# Patient Record
Sex: FEMALE | Race: WHITE
[De-identification: names, ages, dates, MRNs, and addresses within clinical notes are randomized per-mention and may not be internally consistent; named-entity substitution may affect disease eponyms.]

---

## 2019-07-03 NOTE — PDOC
Documentation entered by Emily Smith SCRIBE, acting as scribe for Juliet Hughes MD.








Juliet Hughes MD:  This documentation has been prepared by the Sarah mejia Sammi, SCRIBE, under my direction and personally reviewed by me in 

its entirety.  I confirm that the documentation accurately reflects all work, 

treatment, procedures, and medical decision making performed by me.  





Attending Attestation





- Resident


Resident Name: Dulce Mesa





- ED Attending Attestation


I have performed the following: I have examined & evaluated the patient, The 

case was reviewed & discussed with the resident, I agree w/resident's findings 

& plan, Exceptions are as noted





- HPI


HPI: 





07/03/19 10:13


The patient is a 29 year old female, with no significant PMH, who presents to 

the emergency room for evaluation of over 1 month of vaginal bleeding. Pt 

reports bleeding began 1 month ago, was intermittent, about the same as a 

period (4-5 pads per day) and then stopped for 1 week and half but then 

returned yesteday. She notes the bleeding since yesterday has been heavier and 

she has noted some clotting, and has had to use a towel instead of pads. She 

complains of suprapubic and low back pain intermittently that she describes as 

cramping. The patient states she had stopped her birth control pill that she 

was on for 3 years about 1 month ago and the bleeding started shortly 

afterwards. The patient arrived from [place] this past weekend and 

reports being evaluated there a couple of weeks ago where she was informed the 

bleeding was due to terminating the birth control. The patient does not yet 

have a provider here. 


 


Denies nausea, vomiting, headache, constipation, or diarrhea. Denies CP/SOB, 

headache, focal weakness/numbness. Denies F/C, vaginal DC. She is not sexually 

active. 





Allergies: NKA











- Physicial Exam


PE: 





07/03/19 10:35


agree with resident exam


Well appearing


No abd ttp, rebound or guarding


No CVAT 


No back ttp


No midline spinal ttp





- Medical Decision Making





07/03/19 10:36


28yo F presents to the ED with vaginal bleeding intermittently for 1 month 

immediately after stopping PO birth control


Vitals with borderline tachycardia, otherwise wnl


DDx includes DUB vs first trimester bleeding vs ovarian cyst (although no R or 

L adnexal ttp on Dr. Mesa's exam)


Plan for urine preg, labs, TVUS, UA, reassess





07/03/19 13:26


UPT neg


Labs with anemia to 9.4, otherwise wnl


UA with possible UTI, although dirty sample and thus will hold off on treatment 

as pt has no urinary sxs


TVUS wnl


Likely DUB s/p stopping OCPs


She is hemodynamically stable and well appearing


Communicated to patient to return to the ED if she is soaking through 2 or more 

pads per hour for more than 2 hours


She expresses understadning


She will f/u with Dr. Snider 





I discussed the physical exam findings, ancillary test results and final 

diagnoses with the patient. I answered all of the patient's questions. The 

patient was satisfied with the care received and felt comfortable with the 

discharge plan and treatment plan. The patient will call their primary care 

physician within 24 hours to arrange follow-up and will return to the Emergency 

Department with any new, persistent or worsening symptoms.

## 2019-07-03 NOTE — PDOC
History of Present Illness





- General


Chief Complaint: Vaginal Bleeding


Stated Complaint: VAGINAL BLEEDING


Time Seen by Provider: 19 09:30


History Source: Patient, Parent(s)


Exam Limitations: Language Barrier





- History of Present Illness


Travel History: Yes (From , arrived 2019)


Initial Comments: 





19 10:02


30yo F with no significant PMH presenting to ED for vaginal bleeding. She came 

from the  about 1 week ago. She was on OCPs "Noge?" for 3 years and stopped 

taking it 1 month ago. She was having bleeding after stopping but the bleeding 

stopped for about 1.5 weeks. Bleeding returned last night and has been worse 

today. She states that she used about 2 pads or so before but today she used 

pads and went through 3 towels. She noticed clots as well. She endorses pelvic 

cramping. Denies fevers, chills, n/v/d, urinary symptoms, back pain, sob, chest 

pain, lightheadedness, dizziness, palpitations. 





PMD: in 


PMH: none


PSH: none


Meds: none


Allergies: nkda





Past History





- Past Medical History


Allergies/Adverse Reactions: 


 Allergies











Allergy/AdvReac Type Severity Reaction Status Date / Time


 


No Known Allergies Allergy   Verified 19 09:24











Home Medications: 


Ambulatory Orders





NK [No Known Home Medication]  19 








COPD: No





- Suicide/Smoking/Psychosocial Hx


Smoking History: Never smoked


Information on smoking cessation initiated: No


Hx Alcohol Use: No


Drug/Substance Use Hx: No





*Physical Exam





- Vital Signs


 Last Vital Signs











Temp Pulse Resp BP Pulse Ox


 


 98 F   100 H  18   118/81   100 


 


 19 09:23  19 09:23  19 09:23  19 09:23  19 09:23














ED Treatment Course





- LABORATORY


CBC & Chemistry Diagram: 


 19 10:23





 19 10:23





Medical Decision Making





- Medical Decision Making





19 10:12


30yo F with no significant PMH presenting to ED for vaginal bleeding. She came 

from the  about 1 week ago. She was on OCPs "Noge?" for 3 years and stopped 

taking it 1 month ago. She was having bleeding after stopping but the bleeding 

stopped for about 1.5 weeks. Bleeding returned last night and has been worse 

today. She states that she used about 2 pads or so before but today she used 

pads and went through 3 towels. She noticed clots as well. She endorses pelvic 

cramping. Denies fevers, chills, n/v/d, urinary symptoms, back pain, sob, chest 

pain, lightheadedness, dizziness, palpitations. 


   Vitals: hr 100


   PE: bright blood in vaginal vault with clots, actively bleeding. discomfort 

with bimanual exam





ddx includes but not limited to pregnancy (iup v. ectopic), , fibroids, 

medication discontinuation, coagulopathy, mass/malignancy





with tachycardia, will order cbc, ts, cmp


ua, upreg


iv fluids, tylenol for pain





19 12:32


Labs show low hgb (9.4), low MCV and elevated RDW, likely iron deficiency. 


Urine positive LE but few bacteria. Blood likely from menstruation. 





TVUS normal, no masses, normal adnexa. 





19 12:32


Pt states bleeding is less than this morning. Vitals are normal, safe for dc 

home. Given referral to ob/gyn. pt understands and agrees to plan. 





*DC/Admit/Observation/Transfer


Diagnosis at time of Disposition: 


 Vaginal bleeding








- Discharge Dispostion


Disposition: HOME


Condition at time of disposition: Good


Decision to Admit order: No





- Referrals


Referrals: 


Luke Snider MD [Staff Physician] - 





- Patient Instructions


Printed Discharge Instructions:  DI for Vaginal Bleeding


Additional Instructions: 


Hoy te vieron en la samina de emergencias por sangrado vaginal. Parece que se 

debe a la interrupcin de las pldoras anticonceptivas. La ecografa fue normal.


Los anlisis de pippa muestran que puede tener deficiencia de dafne. El 

sangrado abundante debe detenerse en el siguiente da o dos. Puede rin 

Tylenol o Motrin para el dolor segn sea necesario.





Recomiendo concertar rachelle huong con un gineclogo. La informacin se proporciona 

a continuacin. Utilice las almohadillas proporcionadas en lugar de las toallas.





Regrese a la samina de emergencias si el dolor empeora, el sangrado empeora, se 

siente mareado, siente que va a desmayarse o si se presenta algn sntoma nuevo.





Doug





You were seen in the emergency room today for vaginal bleeding. It seems like 

it is due to stopping the birth control pills. The ultrasound was normal. 


The blood tests show that you may be iron deficient. Heavy bleeding should stop 

in the next day or two. You can take Tylenol or Motrin for the pain as needed.





I recommend making an appointment with a gynecologist. Information is provided 

below. Use the pads provided instead of the towels. 





Please come back to the emergency room if pain gets worse, bleeding gets worse, 

you feel lightheaded, you feel like you are going to faint or if any new 

concerning symptom develops. 





Thank you


Print Language: Vietnamese





- Post Discharge Activity

## 2022-06-12 ENCOUNTER — HOSPITAL ENCOUNTER (EMERGENCY)
Dept: HOSPITAL 74 - JER | Age: 32
Discharge: HOME | End: 2022-06-12
Payer: COMMERCIAL

## 2022-06-12 VITALS — BODY MASS INDEX: 23.3 KG/M2

## 2022-06-12 VITALS — SYSTOLIC BLOOD PRESSURE: 119 MMHG | DIASTOLIC BLOOD PRESSURE: 81 MMHG | TEMPERATURE: 97 F | HEART RATE: 102 BPM

## 2022-06-12 DIAGNOSIS — Z3A.16: ICD-10-CM

## 2022-06-12 DIAGNOSIS — R10.30: ICD-10-CM

## 2022-06-12 DIAGNOSIS — O26.892: Primary | ICD-10-CM

## 2022-06-12 LAB
ANION GAP SERPL CALC-SCNC: 7 MMOL/L (ref 8–16)
APPEARANCE UR: CLEAR
BACTERIA # UR AUTO: 141 /UL (ref 0–1359)
BASOPHILS # BLD: 0.6 % (ref 0–2)
BILIRUB UR STRIP.AUTO-MCNC: NEGATIVE MG/DL
BUN SERPL-MCNC: 9.5 MG/DL (ref 7–18)
CALCIUM SERPL-MCNC: 9 MG/DL (ref 8.5–10.1)
CASTS URNS QL MICRO: 0 /UL (ref 0–3.1)
CHLORIDE SERPL-SCNC: 107 MMOL/L (ref 98–107)
CO2 SERPL-SCNC: 24 MMOL/L (ref 21–32)
COLOR UR: YELLOW
CREAT SERPL-MCNC: 0.6 MG/DL (ref 0.55–1.3)
DEPRECATED RDW RBC AUTO: 13.3 % (ref 11.6–15.6)
EOSINOPHIL # BLD: 0.8 % (ref 0–4.5)
EPITH CASTS URNS QL MICRO: 21 /UL (ref 0–25.1)
GLUCOSE SERPL-MCNC: 86 MG/DL (ref 74–106)
HCT VFR BLD CALC: 37.2 % (ref 32.4–45.2)
HGB BLD-MCNC: 12.5 GM/DL (ref 10.7–15.3)
KETONES UR QL STRIP: NEGATIVE
LEUKOCYTE ESTERASE UR QL STRIP.AUTO: NEGATIVE
LYMPHOCYTES # BLD: 27.4 % (ref 8–40)
MCH RBC QN AUTO: 28.2 PG (ref 25.7–33.7)
MCHC RBC AUTO-ENTMCNC: 33.7 G/DL (ref 32–36)
MCV RBC: 83.5 FL (ref 80–96)
MONOCYTES # BLD AUTO: 7.3 % (ref 3.8–10.2)
NEUTROPHILS # BLD: 63.9 % (ref 42.8–82.8)
NITRITE UR QL STRIP: NEGATIVE
PH UR: 8 [PH] (ref 5–8)
PLATELET # BLD AUTO: 245 10^3/UL (ref 134–434)
PMV BLD: 9.7 FL (ref 7.5–11.1)
PROT UR QL STRIP: NEGATIVE
PROT UR QL STRIP: NEGATIVE
RBC # BLD AUTO: 2 /UL (ref 0–23.9)
RBC # BLD AUTO: 4.45 M/MM3 (ref 3.6–5.2)
SODIUM SERPL-SCNC: 138 MMOL/L (ref 136–145)
SP GR UR: 1.01 (ref 1.01–1.03)
UROBILINOGEN UR STRIP-MCNC: 0.2 MG/DL (ref 0.2–1)
WBC # BLD AUTO: 7.8 K/MM3 (ref 4–10)
WBC # UR AUTO: 4 /UL (ref 0–25.8)

## 2023-01-12 ENCOUNTER — HOSPITAL ENCOUNTER (INPATIENT)
Dept: HOSPITAL 74 - JER | Age: 33
LOS: 3 days | Discharge: HOME | DRG: 463 | End: 2023-01-15
Attending: NURSE PRACTITIONER | Admitting: INTERNAL MEDICINE
Payer: COMMERCIAL

## 2023-01-12 VITALS — BODY MASS INDEX: 24.5 KG/M2

## 2023-01-12 DIAGNOSIS — R74.01: ICD-10-CM

## 2023-01-12 DIAGNOSIS — N12: ICD-10-CM

## 2023-01-12 DIAGNOSIS — N39.0: ICD-10-CM

## 2023-01-12 DIAGNOSIS — N13.6: Primary | ICD-10-CM

## 2023-01-12 LAB
ALBUMIN SERPL-MCNC: 4 G/DL (ref 3.4–5)
ALP SERPL-CCNC: 132 U/L (ref 45–117)
ALT SERPL-CCNC: 201 U/L (ref 13–61)
ANION GAP SERPL CALC-SCNC: 8 MMOL/L (ref 8–16)
APPEARANCE UR: CLEAR
AST SERPL-CCNC: 106 U/L (ref 15–37)
BACTERIA # UR AUTO: 22 /UL (ref 0–1359)
BASOPHILS # BLD: 0.4 % (ref 0–2)
BILIRUB SERPL-MCNC: 0.4 MG/DL (ref 0.2–1)
BILIRUB UR STRIP.AUTO-MCNC: NEGATIVE MG/DL
BUN SERPL-MCNC: 19.2 MG/DL (ref 7–18)
CALCIUM SERPL-MCNC: 8.9 MG/DL (ref 8.5–10.1)
CASTS URNS QL MICRO: 0 /UL (ref 0–3.1)
CHLORIDE SERPL-SCNC: 104 MMOL/L (ref 98–107)
CO2 SERPL-SCNC: 27 MMOL/L (ref 21–32)
COLOR UR: (no result)
CREAT SERPL-MCNC: 1.1 MG/DL (ref 0.55–1.3)
DEPRECATED RDW RBC AUTO: 15.7 % (ref 11.6–15.6)
EOSINOPHIL # BLD: 1.2 % (ref 0–4.5)
EPITH CASTS URNS QL MICRO: 9 /UL (ref 0–25.1)
GLUCOSE SERPL-MCNC: 88 MG/DL (ref 74–106)
HCT VFR BLD CALC: 42.7 % (ref 32.4–45.2)
HGB BLD-MCNC: 14.1 GM/DL (ref 10.7–15.3)
KETONES UR QL STRIP: NEGATIVE
LEUKOCYTE ESTERASE UR QL STRIP.AUTO: (no result)
LYMPHOCYTES # BLD: 29.3 % (ref 8–40)
MCH RBC QN AUTO: 26.8 PG (ref 25.7–33.7)
MCHC RBC AUTO-ENTMCNC: 33 G/DL (ref 32–36)
MCV RBC: 81.3 FL (ref 80–96)
MONOCYTES # BLD AUTO: 10.5 % (ref 3.8–10.2)
NEUTROPHILS # BLD: 58.6 % (ref 42.8–82.8)
NITRITE UR QL STRIP: POSITIVE
PH UR: 6 [PH] (ref 5–8)
PLATELET # BLD AUTO: 161 10^3/UL (ref 134–434)
PMV BLD: 9.6 FL (ref 7.5–11.1)
PROT SERPL-MCNC: 7.1 G/DL (ref 6.4–8.2)
PROT UR QL STRIP: NEGATIVE
PROT UR QL STRIP: NEGATIVE
RBC # BLD AUTO: 21 /UL (ref 0–23.9)
RBC # BLD AUTO: 5.26 M/MM3 (ref 3.6–5.2)
SODIUM SERPL-SCNC: 139 MMOL/L (ref 136–145)
SP GR UR: 1.01 (ref 1.01–1.03)
UROBILINOGEN UR STRIP-MCNC: 0.2 MG/DL (ref 0.2–1)
WBC # BLD AUTO: 8.5 K/MM3 (ref 4–10)
WBC # UR AUTO: 39 /UL (ref 0–25.8)

## 2023-01-12 PROCEDURE — U0003 INFECTIOUS AGENT DETECTION BY NUCLEIC ACID (DNA OR RNA); SEVERE ACUTE RESPIRATORY SYNDROME CORONAVIRUS 2 (SARS-COV-2) (CORONAVIRUS DISEASE [COVID-19]), AMPLIFIED PROBE TECHNIQUE, MAKING USE OF HIGH THROUGHPUT TECHNOLOGIES AS DESCRIBED BY CMS-2020-01-R: HCPCS

## 2023-01-12 PROCEDURE — C2617 STENT, NON-COR, TEM W/O DEL: HCPCS

## 2023-01-12 PROCEDURE — U0005 INFEC AGEN DETEC AMPLI PROBE: HCPCS

## 2023-01-13 LAB
ALBUMIN SERPL-MCNC: 3.6 G/DL (ref 3.4–5)
ALP SERPL-CCNC: 121 U/L (ref 45–117)
ALT SERPL-CCNC: 185 U/L (ref 13–61)
ANION GAP SERPL CALC-SCNC: 6 MMOL/L (ref 8–16)
APTT BLD: 27.9 SECONDS (ref 25.2–36.5)
AST SERPL-CCNC: 91 U/L (ref 15–37)
BILIRUB SERPL-MCNC: 0.6 MG/DL (ref 0.2–1)
BUN SERPL-MCNC: 14.4 MG/DL (ref 7–18)
CALCIUM SERPL-MCNC: 8.8 MG/DL (ref 8.5–10.1)
CHLORIDE SERPL-SCNC: 108 MMOL/L (ref 98–107)
CO2 SERPL-SCNC: 28 MMOL/L (ref 21–32)
CREAT SERPL-MCNC: 0.7 MG/DL (ref 0.55–1.3)
DEPRECATED RDW RBC AUTO: 15.6 % (ref 11.6–15.6)
GLUCOSE SERPL-MCNC: 86 MG/DL (ref 74–106)
HCT VFR BLD CALC: 41.1 % (ref 32.4–45.2)
HGB BLD-MCNC: 13.5 GM/DL (ref 10.7–15.3)
INR BLD: 1.12 (ref 0.83–1.09)
MCH RBC QN AUTO: 26.9 PG (ref 25.7–33.7)
MCHC RBC AUTO-ENTMCNC: 32.9 G/DL (ref 32–36)
MCV RBC: 81.8 FL (ref 80–96)
PLATELET # BLD AUTO: 161 10^3/UL (ref 134–434)
PMV BLD: 10.5 FL (ref 7.5–11.1)
PROT SERPL-MCNC: 6.3 G/DL (ref 6.4–8.2)
PT PNL PPP: 12.9 SEC (ref 9.7–13)
RBC # BLD AUTO: 5.02 M/MM3 (ref 3.6–5.2)
SODIUM SERPL-SCNC: 142 MMOL/L (ref 136–145)
WBC # BLD AUTO: 4.5 K/MM3 (ref 4–10)

## 2023-01-13 PROCEDURE — 0T778DZ DILATION OF LEFT URETER WITH INTRALUMINAL DEVICE, VIA NATURAL OR ARTIFICIAL OPENING ENDOSCOPIC: ICD-10-PCS | Performed by: UROLOGY

## 2023-01-13 PROCEDURE — BT1FZZZ FLUOROSCOPY OF LEFT KIDNEY, URETER AND BLADDER: ICD-10-PCS | Performed by: UROLOGY

## 2023-01-13 RX ADMIN — CEFTRIAXONE SCH MLS: 1 INJECTION, POWDER, FOR SOLUTION INTRAMUSCULAR; INTRAVENOUS at 11:03

## 2023-01-13 RX ADMIN — CEFTRIAXONE SCH MLS/HR: 1 INJECTION, POWDER, FOR SOLUTION INTRAMUSCULAR; INTRAVENOUS at 11:03

## 2023-01-14 RX ADMIN — SODIUM CHLORIDE SCH MLS/HR: 9 INJECTION, SOLUTION INTRAVENOUS at 10:49

## 2023-01-14 RX ADMIN — SODIUM CHLORIDE SCH MLS/HR: 9 INJECTION, SOLUTION INTRAVENOUS at 10:51

## 2023-01-14 RX ADMIN — TAMSULOSIN HYDROCHLORIDE SCH MG: 0.4 CAPSULE ORAL at 10:49

## 2023-01-15 VITALS
TEMPERATURE: 98.7 F | HEART RATE: 76 BPM | SYSTOLIC BLOOD PRESSURE: 130 MMHG | RESPIRATION RATE: 18 BRPM | DIASTOLIC BLOOD PRESSURE: 72 MMHG

## 2023-01-15 RX ADMIN — TAMSULOSIN HYDROCHLORIDE SCH MG: 0.4 CAPSULE ORAL at 09:39
